# Patient Record
Sex: MALE | Race: BLACK OR AFRICAN AMERICAN | Employment: UNEMPLOYED | URBAN - METROPOLITAN AREA
[De-identification: names, ages, dates, MRNs, and addresses within clinical notes are randomized per-mention and may not be internally consistent; named-entity substitution may affect disease eponyms.]

---

## 2020-06-19 ENCOUNTER — HOSPITAL ENCOUNTER (INPATIENT)
Age: 60
LOS: 1 days | Discharge: HOME | DRG: 101 | End: 2020-06-21
Attending: EMERGENCY MEDICINE | Admitting: INTERNAL MEDICINE
Payer: OTHER GOVERNMENT

## 2020-06-19 ENCOUNTER — APPOINTMENT (OUTPATIENT)
Dept: GENERAL RADIOLOGY | Age: 60
DRG: 101 | End: 2020-06-19

## 2020-06-19 ENCOUNTER — APPOINTMENT (OUTPATIENT)
Dept: CT IMAGING | Age: 60
DRG: 101 | End: 2020-06-19

## 2020-06-19 PROBLEM — R56.9 SEIZURE (HCC): Status: ACTIVE | Noted: 2020-06-19

## 2020-06-19 LAB
ACETAMINOPHEN LEVEL: <5 MCG/ML (ref 10–30)
ALBUMIN SERPL-MCNC: 3.9 G/DL (ref 3.5–5.2)
ALP BLD-CCNC: 62 U/L (ref 40–129)
ALT SERPL-CCNC: 9 U/L (ref 0–40)
ANION GAP SERPL CALCULATED.3IONS-SCNC: 11 MMOL/L (ref 7–16)
AST SERPL-CCNC: 15 U/L (ref 0–39)
BASOPHILS ABSOLUTE: 0.06 E9/L (ref 0–0.2)
BASOPHILS RELATIVE PERCENT: 1.6 % (ref 0–2)
BILIRUB SERPL-MCNC: 0.3 MG/DL (ref 0–1.2)
BUN BLDV-MCNC: 11 MG/DL (ref 6–20)
CALCIUM SERPL-MCNC: 9 MG/DL (ref 8.6–10.2)
CHLORIDE BLD-SCNC: 107 MMOL/L (ref 98–107)
CHP ED QC CHECK: YES
CO2: 25 MMOL/L (ref 22–29)
CREAT SERPL-MCNC: 1.2 MG/DL (ref 0.7–1.2)
EOSINOPHILS ABSOLUTE: 0.21 E9/L (ref 0.05–0.5)
EOSINOPHILS RELATIVE PERCENT: 5.4 % (ref 0–6)
ETHANOL: <10 MG/DL (ref 0–0.08)
GFR AFRICAN AMERICAN: >60
GFR NON-AFRICAN AMERICAN: >60 ML/MIN/1.73
GLUCOSE BLD-MCNC: 102 MG/DL (ref 74–99)
GLUCOSE BLD-MCNC: 99 MG/DL
HCT VFR BLD CALC: 40 % (ref 37–54)
HEMOGLOBIN: 12.8 G/DL (ref 12.5–16.5)
IMMATURE GRANULOCYTES #: 0.02 E9/L
IMMATURE GRANULOCYTES %: 0.5 % (ref 0–5)
INR BLD: 1
LACTIC ACID: 1.4 MMOL/L (ref 0.5–2.2)
LYMPHOCYTES ABSOLUTE: 1.75 E9/L (ref 1.5–4)
LYMPHOCYTES RELATIVE PERCENT: 45.2 % (ref 20–42)
MAGNESIUM: 2 MG/DL (ref 1.6–2.6)
MCH RBC QN AUTO: 28.8 PG (ref 26–35)
MCHC RBC AUTO-ENTMCNC: 32 % (ref 32–34.5)
MCV RBC AUTO: 89.9 FL (ref 80–99.9)
METER GLUCOSE: 99 MG/DL (ref 74–99)
MONOCYTES ABSOLUTE: 0.38 E9/L (ref 0.1–0.95)
MONOCYTES RELATIVE PERCENT: 9.8 % (ref 2–12)
NEUTROPHILS ABSOLUTE: 1.45 E9/L (ref 1.8–7.3)
NEUTROPHILS RELATIVE PERCENT: 37.5 % (ref 43–80)
PDW BLD-RTO: 13.2 FL (ref 11.5–15)
PLATELET # BLD: 141 E9/L (ref 130–450)
PMV BLD AUTO: 10.7 FL (ref 7–12)
POTASSIUM REFLEX MAGNESIUM: 3.5 MMOL/L (ref 3.5–5)
PROTHROMBIN TIME: 11.1 SEC (ref 9.3–12.4)
RBC # BLD: 4.45 E12/L (ref 3.8–5.8)
SALICYLATE, SERUM: <0.3 MG/DL (ref 0–30)
SODIUM BLD-SCNC: 143 MMOL/L (ref 132–146)
TOTAL CK: 126 U/L (ref 20–200)
TOTAL PROTEIN: 6.9 G/DL (ref 6.4–8.3)
TRICYCLIC ANTIDEPRESSANTS SCREEN SERUM: NEGATIVE NG/ML
WBC # BLD: 3.9 E9/L (ref 4.5–11.5)

## 2020-06-19 PROCEDURE — 82550 ASSAY OF CK (CPK): CPT

## 2020-06-19 PROCEDURE — 99285 EMERGENCY DEPT VISIT HI MDM: CPT

## 2020-06-19 PROCEDURE — 82962 GLUCOSE BLOOD TEST: CPT

## 2020-06-19 PROCEDURE — 80053 COMPREHEN METABOLIC PANEL: CPT

## 2020-06-19 PROCEDURE — 6360000002 HC RX W HCPCS: Performed by: EMERGENCY MEDICINE

## 2020-06-19 PROCEDURE — G0480 DRUG TEST DEF 1-7 CLASSES: HCPCS

## 2020-06-19 PROCEDURE — 85610 PROTHROMBIN TIME: CPT

## 2020-06-19 PROCEDURE — 2580000003 HC RX 258: Performed by: INTERNAL MEDICINE

## 2020-06-19 PROCEDURE — G0378 HOSPITAL OBSERVATION PER HR: HCPCS

## 2020-06-19 PROCEDURE — 80177 DRUG SCRN QUAN LEVETIRACETAM: CPT

## 2020-06-19 PROCEDURE — 80183 DRUG SCRN QUANT OXCARBAZEPIN: CPT

## 2020-06-19 PROCEDURE — 93005 ELECTROCARDIOGRAM TRACING: CPT | Performed by: EMERGENCY MEDICINE

## 2020-06-19 PROCEDURE — 96374 THER/PROPH/DIAG INJ IV PUSH: CPT

## 2020-06-19 PROCEDURE — 70450 CT HEAD/BRAIN W/O DYE: CPT

## 2020-06-19 PROCEDURE — 71045 X-RAY EXAM CHEST 1 VIEW: CPT

## 2020-06-19 PROCEDURE — 2700000000 HC OXYGEN THERAPY PER DAY

## 2020-06-19 PROCEDURE — 85025 COMPLETE CBC W/AUTO DIFF WBC: CPT

## 2020-06-19 PROCEDURE — 83605 ASSAY OF LACTIC ACID: CPT

## 2020-06-19 PROCEDURE — 83735 ASSAY OF MAGNESIUM: CPT

## 2020-06-19 PROCEDURE — 80307 DRUG TEST PRSMV CHEM ANLYZR: CPT

## 2020-06-19 RX ORDER — PROMETHAZINE HYDROCHLORIDE 25 MG/1
12.5 TABLET ORAL EVERY 6 HOURS PRN
Status: DISCONTINUED | OUTPATIENT
Start: 2020-06-19 | End: 2020-06-21 | Stop reason: HOSPADM

## 2020-06-19 RX ORDER — OXCARBAZEPINE 300 MG/1
300 TABLET, FILM COATED ORAL 2 TIMES DAILY
Status: DISCONTINUED | OUTPATIENT
Start: 2020-06-19 | End: 2020-06-20

## 2020-06-19 RX ORDER — LEVETIRACETAM 10 MG/ML
1000 INJECTION INTRAVASCULAR ONCE
Status: COMPLETED | OUTPATIENT
Start: 2020-06-19 | End: 2020-06-19

## 2020-06-19 RX ORDER — POLYETHYLENE GLYCOL 3350 17 G/17G
17 POWDER, FOR SOLUTION ORAL DAILY PRN
Status: DISCONTINUED | OUTPATIENT
Start: 2020-06-19 | End: 2020-06-21 | Stop reason: HOSPADM

## 2020-06-19 RX ORDER — ACETAMINOPHEN 650 MG/1
650 SUPPOSITORY RECTAL EVERY 6 HOURS PRN
Status: DISCONTINUED | OUTPATIENT
Start: 2020-06-19 | End: 2020-06-21 | Stop reason: HOSPADM

## 2020-06-19 RX ORDER — VENLAFAXINE HYDROCHLORIDE 150 MG/1
150 CAPSULE, EXTENDED RELEASE ORAL DAILY
Status: CANCELLED | OUTPATIENT
Start: 2020-06-19

## 2020-06-19 RX ORDER — ASPIRIN 81 MG/1
81 TABLET ORAL DAILY
Status: DISCONTINUED | OUTPATIENT
Start: 2020-06-20 | End: 2020-06-21 | Stop reason: HOSPADM

## 2020-06-19 RX ORDER — OXCARBAZEPINE 300 MG/1
600 TABLET, FILM COATED ORAL 2 TIMES DAILY
Status: CANCELLED | OUTPATIENT
Start: 2020-06-19

## 2020-06-19 RX ORDER — SODIUM CHLORIDE 0.9 % (FLUSH) 0.9 %
10 SYRINGE (ML) INJECTION EVERY 12 HOURS SCHEDULED
Status: DISCONTINUED | OUTPATIENT
Start: 2020-06-19 | End: 2020-06-21 | Stop reason: HOSPADM

## 2020-06-19 RX ORDER — OXCARBAZEPINE 300 MG/1
300 TABLET, FILM COATED ORAL 2 TIMES DAILY
COMMUNITY

## 2020-06-19 RX ORDER — ACETAMINOPHEN 325 MG/1
650 TABLET ORAL EVERY 6 HOURS PRN
Status: DISCONTINUED | OUTPATIENT
Start: 2020-06-19 | End: 2020-06-21 | Stop reason: HOSPADM

## 2020-06-19 RX ORDER — SODIUM CHLORIDE 0.9 % (FLUSH) 0.9 %
10 SYRINGE (ML) INJECTION PRN
Status: DISCONTINUED | OUTPATIENT
Start: 2020-06-19 | End: 2020-06-21 | Stop reason: HOSPADM

## 2020-06-19 RX ORDER — LEVETIRACETAM 500 MG/1
500 TABLET ORAL 2 TIMES DAILY
Status: DISCONTINUED | OUTPATIENT
Start: 2020-06-19 | End: 2020-06-20

## 2020-06-19 RX ORDER — CLOPIDOGREL BISULFATE 75 MG/1
75 TABLET ORAL DAILY
Status: CANCELLED | OUTPATIENT
Start: 2020-06-19

## 2020-06-19 RX ORDER — ONDANSETRON 2 MG/ML
4 INJECTION INTRAMUSCULAR; INTRAVENOUS EVERY 6 HOURS PRN
Status: DISCONTINUED | OUTPATIENT
Start: 2020-06-19 | End: 2020-06-21 | Stop reason: HOSPADM

## 2020-06-19 RX ORDER — SODIUM CHLORIDE 9 MG/ML
INJECTION, SOLUTION INTRAVENOUS CONTINUOUS
Status: DISCONTINUED | OUTPATIENT
Start: 2020-06-19 | End: 2020-06-20 | Stop reason: SDUPTHER

## 2020-06-19 RX ADMIN — SODIUM CHLORIDE, PRESERVATIVE FREE 10 ML: 5 INJECTION INTRAVENOUS at 23:21

## 2020-06-19 RX ADMIN — LEVETIRACETAM 1000 MG: 10 INJECTION INTRAVENOUS at 16:35

## 2020-06-19 RX ADMIN — SODIUM CHLORIDE: 9 INJECTION, SOLUTION INTRAVENOUS at 23:21

## 2020-06-19 NOTE — ED NOTES
Patient not keeping NRB on his face, oxygen saturation wnl, patient switched to NC.       Anna Souza RN  06/19/20 0730

## 2020-06-19 NOTE — ED NOTES
Bed: 13  Expected date:   Expected time:   Means of arrival:   Comments:  ADRIANA Ta RN  06/19/20 7926

## 2020-06-19 NOTE — ED PROVIDER NOTES
Comments: Moves all extremities x 4   Skin:     General: Skin is warm and dry. Capillary Refill: Capillary refill takes less than 2 seconds. Coloration: Skin is not jaundiced. Neurological:      Mental Status: He is unresponsive. Motor: Tremor and seizure activity present. Comments: Patient arrives with eyes deviated to the right, with intent present. Patient having tremors to all extremities that gradually was resolved after receiving Versed from EMS. Psychiatric:         Speech: He is noncommunicative. Procedures     MDM  Number of Diagnoses or Management Options  Seizures [facility]):   Diagnosis management comments: Patient presents to the ED for above signs and symptoms. Based on patient's presentation, there was suspicion for status epilepticus. Patient was given Ativan and Versed prior to arrival with good relief of his seizure. Patient given Keppra here in the ED. Suspicion of breakthrough seizure with known history of status post CVA. Blood work and imaging obtained. CT head unremarkable for intracranial hemorrhage. No clear signs of infection including meningitis. No drugs on board. No electrolyte deficits. With outpatient returning to baseline, he warrants further evaluation inpatient admission. Case discussed with medicine who agrees. ED Course as of Jun 19 2014   Fri Jun 19, 2020   1640 Patient no longer seizing, appears postictal.  We will continue to assess. [MA]   1801 Patient reassessed. Patient still postictal at this time while sleeping in bed comfortably. No further seizure activity. Awaiting final results. [MA]      ED Course User Index  [MA] Lavern Abbasi DO          ----------------------------------------------- PAST HISTORY --------------------------------------------  Past Medical History:  has a past medical history of Seizure ([address] and Unspecified cerebral artery occlusion with cerebral infarction.     Past Surgical History:  has no past none.      COUNSELING:   I have spoken with the patient and discussed todays results, in addition to providing specific details for the plan of care and counseling regarding the diagnosis and prognosis.     --------------------------------------- IMPRESSION & DISPOSITION --------------------------------     IMPRESSION(s):  1. Seizures ([address]        This patient's ED course included: a personal history and physicial examination, IV medications, cardiac monitoring and continuous pulse oximetry    This patient has remained hemodynamically stable and been closely monitored during their ED course. DISPOSITION:  Disposition: Admit to telemetry. Patient condition is stable. END OF PROVIDER NOTE.         Kayley Reynoso DO  Resident  06/19/20 2014

## 2020-06-19 NOTE — H&P
Recent Labs     06/19/20  1637      K 3.5      CO2 25   BUN 11   CREATININE 1.2   CALCIUM 9.0     Recent Labs     06/19/20  1637   AST 15   ALT 9   BILITOT 0.3   ALKPHOS 62     Recent Labs     06/19/20  1637   INR 1.0     No results for input(s): Patricia Velazco in the last 72 hours. Imaging:  CT Head WO Contrast   Final Result      1. No acute intracranial hemorrhage or edema. 2. Stable area of encephalomalacic change is associated with left   hemisphere which could suggest an area of chronic stroke. XR CHEST PORTABLE   Final Result   Tortuous ectatic aorta       Admission related labs, imaging studies including EKG (if indicated and done) have been reviewed by myself. CT head without contrast done at the time of admission showed changes suggestive of chronic ischemic CVA in left cerebral hemisphere. ASSESSMENT:  Active Hospital Problems    Diagnosis Date Noted    Seizure [facility]) [R56.9] 06/19/2020    Breakthrough seizure ([facility] [address] [G40.919]      · Breakthrough seizures. · Altered mental status, likely postictal confusion. · Chronic seizure disorder, on Trileptal 300 mg twice daily at home. · History of CVA affecting left MCA with residual mild right-sided weakness. Plan:     Patient will be admitted for observation. Frequent neurochecks have been ordered.  Patient will be on seizure and fall precautions.  We will check his Trileptal levels.  Patient restarted on his home dose of Trileptal along with p.o. Keppra 500 mg twice daily. [facility] Neurology will be consulted to see if any medication adjustments are needed.  Patient will also be started on low-dose aspirin because of his previous history of CVA.  Physical therapy to be ordered        Body mass index is 26.58 kg/m². DVT Prophylaxis: Ordered. Diet: No diet orders on file    Code Status: Prior    PT/OT Eval Status  Ordered. Disposition  Observation status.       Allan Hurley MD  Sound

## 2020-06-20 PROBLEM — G40.909 RECURRENT SEIZURES (HCC): Status: ACTIVE | Noted: 2020-06-20

## 2020-06-20 LAB
BACTERIA: NORMAL /HPF
BILIRUBIN URINE: NEGATIVE
BLOOD, URINE: NEGATIVE
CLARITY: CLEAR
COLOR: YELLOW
EKG ATRIAL RATE: 69 BPM
EKG P AXIS: 52 DEGREES
EKG P-R INTERVAL: 146 MS
EKG Q-T INTERVAL: 388 MS
EKG QRS DURATION: 102 MS
EKG QTC CALCULATION (BAZETT): 415 MS
EKG R AXIS: 58 DEGREES
EKG T AXIS: 50 DEGREES
EKG VENTRICULAR RATE: 69 BPM
GLUCOSE URINE: NEGATIVE MG/DL
KETONES, URINE: NEGATIVE MG/DL
LEUKOCYTE ESTERASE, URINE: NEGATIVE
NITRITE, URINE: NEGATIVE
PH UA: 5.5 (ref 5–9)
PROTEIN UA: NEGATIVE MG/DL
RBC UA: NORMAL /HPF (ref 0–2)
SPECIFIC GRAVITY UA: >=1.03 (ref 1–1.03)
UROBILINOGEN, URINE: 0.2 E.U./DL
WBC UA: NORMAL /HPF (ref 0–5)

## 2020-06-20 PROCEDURE — 96372 THER/PROPH/DIAG INJ SC/IM: CPT

## 2020-06-20 PROCEDURE — 6370000000 HC RX 637 (ALT 250 FOR IP): Performed by: INTERNAL MEDICINE

## 2020-06-20 PROCEDURE — 99221 1ST HOSP IP/OBS SF/LOW 40: CPT | Performed by: PSYCHIATRY & NEUROLOGY

## 2020-06-20 PROCEDURE — 99233 SBSQ HOSP IP/OBS HIGH 50: CPT | Performed by: INTERNAL MEDICINE

## 2020-06-20 PROCEDURE — 97161 PT EVAL LOW COMPLEX 20 MIN: CPT | Performed by: PHYSICAL THERAPIST

## 2020-06-20 PROCEDURE — 97530 THERAPEUTIC ACTIVITIES: CPT | Performed by: PHYSICAL THERAPIST

## 2020-06-20 PROCEDURE — 2140000000 HC CCU INTERMEDIATE R&B

## 2020-06-20 PROCEDURE — 6360000002 HC RX W HCPCS: Performed by: INTERNAL MEDICINE

## 2020-06-20 PROCEDURE — 93010 ELECTROCARDIOGRAM REPORT: CPT | Performed by: INTERNAL MEDICINE

## 2020-06-20 PROCEDURE — 6370000000 HC RX 637 (ALT 250 FOR IP): Performed by: PSYCHIATRY & NEUROLOGY

## 2020-06-20 PROCEDURE — 81001 URINALYSIS AUTO W/SCOPE: CPT

## 2020-06-20 RX ORDER — SODIUM CHLORIDE 9 MG/ML
INJECTION, SOLUTION INTRAVENOUS CONTINUOUS
Status: ACTIVE | OUTPATIENT
Start: 2020-06-20 | End: 2020-06-20

## 2020-06-20 RX ORDER — OXCARBAZEPINE 300 MG/1
450 TABLET, FILM COATED ORAL 2 TIMES DAILY
Status: DISCONTINUED | OUTPATIENT
Start: 2020-06-20 | End: 2020-06-21 | Stop reason: HOSPADM

## 2020-06-20 RX ADMIN — OXCARBAZEPINE 450 MG: 300 TABLET, FILM COATED ORAL at 20:40

## 2020-06-20 RX ADMIN — OXCARBAZEPINE 300 MG: 300 TABLET, FILM COATED ORAL at 08:58

## 2020-06-20 RX ADMIN — LEVETIRACETAM 500 MG: 500 TABLET ORAL at 08:58

## 2020-06-20 RX ADMIN — ENOXAPARIN SODIUM 40 MG: 40 INJECTION SUBCUTANEOUS at 03:33

## 2020-06-20 NOTE — PROGRESS NOTES
Contacted attending provider via perfect serve.     Electronically signed by Yumiko Hyde RN on 6/19/2020 at 9:05 PM

## 2020-06-20 NOTE — PROGRESS NOTES
the following: enoxaparin (Lovenox) 40mg SQ 2h prior to surgery then QD) and Encourage ambulation, low risk for DVT, no chemical or mechanical prophylaxis necessary    Data:  CBC:  Recent Labs     06/19/20  1637   WBC 3.9*   RBC 4.45   HGB 12.8   HCT 40.0   MCV 89.9   RDW 13.2        BMP:  Recent Labs     06/19/20  1637      K 3.5      CO2 25   BUN 11   CREATININE 1.2     BNP: No results for input(s): BNP in the last 72 hours. PT/INR:   Recent Labs     06/19/20  1637   PROTIME 11.1   INR 1.0     APTT: No results for input(s): APTT in the last 72 hours. CARDIAC ENZYMES:No results for input(s): CKMB, CKMBINDEX, TROPONINT in the last 72 hours. Invalid input(s): CKTOTAL;3  FASTING LIPID PANEL:No results found for: CHOL, HDL, TRIG  LIVER PROFILE:   Recent Labs     06/19/20  1637   AST 15   ALT 9   BILITOT 0.3   ALKPHOS 62      ABGs:   Lab Results   Component Value Date    PH 7.394 06/30/2013    PCO2 40.4 06/30/2013    PO2 112.3 06/30/2013    HCO3 24.1 06/30/2013    O2SAT 98.3 06/30/2013       Assessment/Plan:  Principal Problem:    Breakthrough seizure ([facility] [address]  Active Problems:    Seizure ([facility] [address]  Resolved Problems:    * No resolved hospital problems. *    Summary: Mr. Beasley Officer, a 63y.o. year old male with medical history significant for seizure disorder and CVA [10 or 11 years ago affecting right side of his body, ambulatory at home with the help of cane] presented with c/o of breakthrough seizures that were witnessed at home by the patient's mother. The EMS administered Versed and Ativan, however, on arrival to ER, he was having active seizures. He was administer loading dose of 1000 mg IV Keppra which led to seizure subsiding. The patient stays with her mother who is in her late [de-identified]. Per sister-in-law who was at patient's bedside who visits the patient 2-3 times a week, the patient is compliant with his medications and states that patient's last seizure was few years ago.   At home, the patient is on Trileptal 300 mg 1 tablet twice a day per sister-in-law, and is not on other medication either for seizure or other medical issues. However, EMR stated that patient takes Keppra as well as Trileptal at home. On admission examination, the patient denies any acute complaints. However patient is slightly confused and does not follow some of the instructions appropriately.    - [6/20] aphasic with right hemiparesis, stable. Refused oral Keppra - discussed and convince patient to take. Neuro consulted. EEG pending. On ASA.      Time Spent: 45 minutes in assessment, care and management    Electronically signed by Katherine Aldrich MD on 6/20/2020 at 8:37 AM    [facility] Hospitalist

## 2020-06-20 NOTE — CONSULTS
06/19/2020       CT head:      1. No acute intracranial hemorrhage or edema.       2. Stable area of encephalomalacic change is associated with left   hemisphere which could suggest an area of chronic stroke. I independently reviewed the labs and imaging studies at today's appointment. Assessment:     Breakthrough seizure. Pt is on a relatively small dose of trileptal.     Patient Active Problem List   Diagnosis    Breakthrough seizure ([address]    Seizure ([address]       Plan:     Increase trileptal to 450mg BID. Establish with neurology. PCP for medical management. He should probably be on at least aspirin and statin for secondary stroke prevention but I do not know anything about this history so I will differ for now as there may be reasons why he is not.        Fredericka Boast  11:11 AM  6/20/2020

## 2020-06-20 NOTE — PROGRESS NOTES
therapy, safety with mobility, improved gait mechanics    Patient response to education:   Pt verbalized understanding Pt demonstrated skill Pt requires further education in this area   yes yes yes     ASSESSMENT:    Comments:  Pt resting semi-supine upon arrival, agreeable to PT eval. Pt completed bed mobility without external assistance, cues for safety and slightly slowed pace to allow for safe adjustment of lines while moving. Pt completed sit<>stand with unsteadiness requiring hands on assist for balance. He requests therapist to not provide L HHA and amb without AD. He demonstrates ataxic gait pattern with circumduction of R LE and exaggerated rotation of R hemibody to progress R side forward, mildly unsteady requiring hands on assist for balance. Pt completed additional sit<>stand from toilet with steadying assistance. Pt returned to bed upon completion of session with all needs in reach and bed alarm set. Pt is near baseline but will benefit from short course of skilled PT services to improve independence with functional mobility and balance. Treatment:  Patient practiced and was instructed in the following treatment:     Therapeutic activities: verbal/tactile cues for safe technique with transfers, cues for attention to surroundings and lines to prevent pulling   Gait training: verbal/tactile cues for improved balance and safety awareness during ambulation, cues to slow pace for improved balance. Pt's/ family goals   1. To return home with family    Patient and or family understand(s) diagnosis, prognosis, and plan of care. yes    PLAN:    PT care will be provided in accordance with the objectives noted above. Exercises and functional mobility practice will be used as well as appropriate assistive devices or modalities to obtain goals. Patient and family education will also be administered as needed. Frequency of treatments: 2-5x/week x 3-5 days.     Time in  0736  Time out  0750    Total Treatment Time  10 minutes     Evaluation Time includes thorough review of current medical information, gathering information on past medical history/social history and prior level of function, completion of standardized testing/informal observation of tasks, assessment of data and education on plan of care and goals.     CPT codes:  [x] Low Complexity PT evaluation 26052  [] Moderate Complexity PT evaluation 84082  [] High Complexity PT evaluation 35413  [] PT Re-evaluation 18665  [] Gait training 63634 0 minutes  [] Manual therapy 37719 0 minutes  [x] Therapeutic activities 09005 10 minutes  [] Therapeutic exercises 45376 0 minutes  [] Neuromuscular reeducation 86438 0 minutes     Annie Kwok, PT, DPT  [id number]

## 2020-06-20 NOTE — PROGRESS NOTES
MRI check list was completed at the bedside with family member and one on the phone.     Electronically signed by Nestor Fraser RN on 6/19/2020 at 8:52 PM

## 2020-06-21 VITALS
HEART RATE: 56 BPM | TEMPERATURE: 97.9 F | RESPIRATION RATE: 16 BRPM | WEIGHT: 182.4 LBS | SYSTOLIC BLOOD PRESSURE: 121 MMHG | BODY MASS INDEX: 24.18 KG/M2 | HEIGHT: 73 IN | OXYGEN SATURATION: 99 % | DIASTOLIC BLOOD PRESSURE: 69 MMHG

## 2020-06-21 LAB
ALBUMIN SERPL-MCNC: 3.4 G/DL (ref 3.5–5.2)
ALP BLD-CCNC: 54 U/L (ref 40–129)
ALT SERPL-CCNC: 9 U/L (ref 0–40)
ANION GAP SERPL CALCULATED.3IONS-SCNC: 9 MMOL/L (ref 7–16)
AST SERPL-CCNC: 21 U/L (ref 0–39)
BASOPHILS ABSOLUTE: 0.05 E9/L (ref 0–0.2)
BASOPHILS RELATIVE PERCENT: 1.1 % (ref 0–2)
BILIRUB SERPL-MCNC: 0.5 MG/DL (ref 0–1.2)
BUN BLDV-MCNC: 14 MG/DL (ref 6–20)
CALCIUM SERPL-MCNC: 8.8 MG/DL (ref 8.6–10.2)
CHLORIDE BLD-SCNC: 107 MMOL/L (ref 98–107)
CO2: 21 MMOL/L (ref 22–29)
CREAT SERPL-MCNC: 1.1 MG/DL (ref 0.7–1.2)
EKG ATRIAL RATE: 50 BPM
EKG P-R INTERVAL: 148 MS
EKG Q-T INTERVAL: 420 MS
EKG QRS DURATION: 90 MS
EKG QTC CALCULATION (BAZETT): 382 MS
EKG R AXIS: 50 DEGREES
EKG T AXIS: 56 DEGREES
EKG VENTRICULAR RATE: 50 BPM
EOSINOPHILS ABSOLUTE: 0.15 E9/L (ref 0.05–0.5)
EOSINOPHILS RELATIVE PERCENT: 3.4 % (ref 0–6)
GFR AFRICAN AMERICAN: >60
GFR NON-AFRICAN AMERICAN: >60 ML/MIN/1.73
GLUCOSE BLD-MCNC: 76 MG/DL (ref 74–99)
HCT VFR BLD CALC: 42 % (ref 37–54)
HEMOGLOBIN: 13.6 G/DL (ref 12.5–16.5)
IMMATURE GRANULOCYTES #: 0.01 E9/L
IMMATURE GRANULOCYTES %: 0.2 % (ref 0–5)
LYMPHOCYTES ABSOLUTE: 1.28 E9/L (ref 1.5–4)
LYMPHOCYTES RELATIVE PERCENT: 29.3 % (ref 20–42)
MAGNESIUM: 2.1 MG/DL (ref 1.6–2.6)
MCH RBC QN AUTO: 29 PG (ref 26–35)
MCHC RBC AUTO-ENTMCNC: 32.4 % (ref 32–34.5)
MCV RBC AUTO: 89.6 FL (ref 80–99.9)
MONOCYTES ABSOLUTE: 0.33 E9/L (ref 0.1–0.95)
MONOCYTES RELATIVE PERCENT: 7.6 % (ref 2–12)
NEUTROPHILS ABSOLUTE: 2.55 E9/L (ref 1.8–7.3)
NEUTROPHILS RELATIVE PERCENT: 58.4 % (ref 43–80)
PDW BLD-RTO: 13 FL (ref 11.5–15)
PLATELET # BLD: 149 E9/L (ref 130–450)
PMV BLD AUTO: 10.9 FL (ref 7–12)
POTASSIUM REFLEX MAGNESIUM: 4.6 MMOL/L (ref 3.5–5)
RBC # BLD: 4.69 E12/L (ref 3.8–5.8)
REASON FOR REJECTION: NORMAL
REJECTED TEST: NORMAL
SODIUM BLD-SCNC: 137 MMOL/L (ref 132–146)
T4 FREE: 1.15 NG/DL (ref 0.93–1.7)
TOTAL PROTEIN: 6.4 G/DL (ref 6.4–8.3)
TSH SERPL DL<=0.05 MIU/L-ACNC: 2.45 UIU/ML (ref 0.27–4.2)
WBC # BLD: 4.4 E9/L (ref 4.5–11.5)

## 2020-06-21 PROCEDURE — 84443 ASSAY THYROID STIM HORMONE: CPT

## 2020-06-21 PROCEDURE — 36415 COLL VENOUS BLD VENIPUNCTURE: CPT

## 2020-06-21 PROCEDURE — 85025 COMPLETE CBC W/AUTO DIFF WBC: CPT

## 2020-06-21 PROCEDURE — 83735 ASSAY OF MAGNESIUM: CPT

## 2020-06-21 PROCEDURE — 99239 HOSP IP/OBS DSCHRG MGMT >30: CPT | Performed by: INTERNAL MEDICINE

## 2020-06-21 PROCEDURE — 93010 ELECTROCARDIOGRAM REPORT: CPT | Performed by: INTERNAL MEDICINE

## 2020-06-21 PROCEDURE — 80053 COMPREHEN METABOLIC PANEL: CPT

## 2020-06-21 PROCEDURE — 6370000000 HC RX 637 (ALT 250 FOR IP): Performed by: PSYCHIATRY & NEUROLOGY

## 2020-06-21 PROCEDURE — 84439 ASSAY OF FREE THYROXINE: CPT

## 2020-06-21 PROCEDURE — 93005 ELECTROCARDIOGRAM TRACING: CPT | Performed by: FAMILY MEDICINE

## 2020-06-21 RX ORDER — ASPIRIN 81 MG/1
81 TABLET ORAL DAILY
Qty: 30 TABLET | Refills: 0 | Status: SHIPPED | OUTPATIENT
Start: 2020-06-22

## 2020-06-21 RX ORDER — OXCARBAZEPINE 150 MG/1
450 TABLET, FILM COATED ORAL 2 TIMES DAILY
Qty: 60 TABLET | Refills: 3 | Status: SHIPPED | OUTPATIENT
Start: 2020-06-21

## 2020-06-21 RX ORDER — CLOPIDOGREL BISULFATE 75 MG/1
75 TABLET ORAL DAILY
Qty: 30 TABLET | Refills: 0 | Status: SHIPPED | OUTPATIENT
Start: 2020-06-21

## 2020-06-21 RX ORDER — ATORVASTATIN CALCIUM 40 MG/1
40 TABLET, FILM COATED ORAL DAILY
Qty: 30 TABLET | Refills: 0 | Status: SHIPPED | OUTPATIENT
Start: 2020-06-21

## 2020-06-21 RX ADMIN — OXCARBAZEPINE 450 MG: 300 TABLET, FILM COATED ORAL at 08:22

## 2020-06-21 ASSESSMENT — PAIN SCALES - GENERAL: PAINLEVEL_OUTOF10: 0

## 2020-06-21 NOTE — DISCHARGE SUMMARY
and Keppra was discontinued and Trileptal dose was increased to 450 mg bid. He was discharge on this and stroke Px: ASP, Plavix and atorvastatin. Home health with RN/PT was ordered.      Time Spent: 45 minutes in assessment, care and management      Consultants:  Neurology Dr. Justine Mccall [6/20/2020]:  Assessment:      Breakthrough seizure. Pt is on a relatively small dose of trileptal.          Patient Active Problem List   Diagnosis    Breakthrough seizure ([address]    Seizure ([address]         Plan:      Increase trileptal to 450mg BID. Establish with neurology. PCP for medical management. He should probably be on at least aspirin and statin for secondary stroke prevention but I do not know anything about this history so I will differ for now as there may be reasons why he is not.          Discharge Medications:       Medication List      START taking these medications    aspirin 81 MG EC tablet  Take 1 tablet by mouth daily  Start taking on:  June 22, 2020     atorvastatin 40 MG tablet  Commonly known as:  LIPITOR  Take 1 tablet by mouth daily        CHANGE how you take these medications    * OXcarbazepine 300 MG tablet  Commonly known as:  TRILEPTAL  What changed:  Another medication with the same name was added. Make sure you understand how and when to take each. * OXcarbazepine 150 MG tablet  Commonly known as:  TRILEPTAL  Take 3 tablets by mouth 2 times daily  What changed: You were already taking a medication with the same name, and this prescription was added. Make sure you understand how and when to take each. * This list has 2 medication(s) that are the same as other medications prescribed for you. Read the directions carefully, and ask your doctor or other care provider to review them with you.             CONTINUE taking these medications    clopidogrel 75 MG tablet  Commonly known as:  PLAVIX  Take 1 tablet by mouth daily           Where to Get Your Medications      You can get these Interval 148 ms    QRS Duration 90 ms    Q-T Interval 420 ms    QTc Calculation (Bazett) 382 ms    R Axis 50 degrees    T Axis 56 degrees       Diet:  DIET GENERAL;     Activity:  Activity as tolerated (Patient may move about with assist as indicated or with supervision.)    Follow-up:  in 3 to 5 days with Bernardino Smith DO, Neurology in 2 to 3 weeks    Disposition: home with home health: RN & PT    Condition: Stable    Time Spent: 45 minutes    Electronically signed by Chelle Murphy MD on 6/21/2020 at 10:53 AM    Discharging Hospitalist

## 2020-06-21 NOTE — PLAN OF CARE
Problem: Falls - Risk of:  Goal: Will remain free from falls  Description: Will remain free from falls  6/21/2020 0950 by Brandon Souza RN  Outcome: Met This Shift     Problem: Falls - Risk of:  Goal: Absence of physical injury  Description: Absence of physical injury  Outcome: Met This Shift

## 2020-06-21 NOTE — PROGRESS NOTES
Spoke with patient's family, they do not need [place] [address] services at this time.        Jeyson Hernandez RN

## 2020-06-22 LAB — KEPPRA: 15 UG/ML (ref 12–46)

## 2020-06-25 LAB — OXCARBAZEPINE: 5 UG/ML (ref 3–35)
